# Patient Record
Sex: MALE | Race: WHITE
[De-identification: names, ages, dates, MRNs, and addresses within clinical notes are randomized per-mention and may not be internally consistent; named-entity substitution may affect disease eponyms.]

---

## 2020-01-22 VITALS — SYSTOLIC BLOOD PRESSURE: 156 MMHG | DIASTOLIC BLOOD PRESSURE: 83 MMHG

## 2020-01-22 LAB
BASOPHILS NFR BLD AUTO: 0.7 % (ref 0–5)
BUN SERPL-MCNC: 20 MG/DL (ref 7–18)
CHLORIDE SERPL-SCNC: 104 MMOL/L (ref 101–111)
CO2 SERPL-SCNC: 31 MMOL/L (ref 21–32)
CREAT SERPL-MCNC: 1 MG/DL (ref 0.5–1.5)
EOSINOPHIL NFR BLD AUTO: 1.3 % (ref 0–8)
ERYTHROCYTE [DISTWIDTH] IN BLOOD BY AUTOMATED COUNT: 14.4 % (ref 11–15.5)
GFR SERPL CREATININE-BSD FRML MDRD: 78 ML/MIN (ref 60–?)
GLUCOSE SERPL-MCNC: 119 MG/DL (ref 70–105)
HCT VFR BLD AUTO: 40.8 % (ref 42–54)
LYMPHOCYTES NFR SPEC AUTO: 30.6 % (ref 21–51)
MCH RBC QN AUTO: 30.6 PG (ref 27–33)
MCHC RBC AUTO-ENTMCNC: 33.3 G/DL (ref 32–36)
MCV RBC AUTO: 91.7 FL (ref 79–99)
MONOCYTES NFR BLD AUTO: 5.6 % (ref 3–13)
NEUTROPHILS NFR BLD AUTO: 61.5 % (ref 40–77)
NRBC BLD MANUAL-RTO: 0 % (ref 0–0.19)
PLATELET # BLD AUTO: 314 K/UL (ref 130–400)
POTASSIUM SERPL-SCNC: 4.2 MMOL/L (ref 3.5–5.1)
RBC # BLD AUTO: 4.45 MIL/UL (ref 4.5–6.2)
SODIUM SERPL-SCNC: 142 MMOL/L (ref 136–145)
WBC # BLD AUTO: 6.8 K/UL (ref 4.8–10.8)

## 2020-01-24 ENCOUNTER — HOSPITAL ENCOUNTER (OUTPATIENT)
Dept: HOSPITAL 90 - DAH | Age: 75
Discharge: HOME | End: 2020-01-24
Attending: SURGERY
Payer: MEDICARE

## 2020-01-24 VITALS — SYSTOLIC BLOOD PRESSURE: 133 MMHG | DIASTOLIC BLOOD PRESSURE: 75 MMHG

## 2020-01-24 VITALS — DIASTOLIC BLOOD PRESSURE: 74 MMHG | SYSTOLIC BLOOD PRESSURE: 134 MMHG

## 2020-01-24 VITALS — SYSTOLIC BLOOD PRESSURE: 148 MMHG | DIASTOLIC BLOOD PRESSURE: 78 MMHG

## 2020-01-24 VITALS — DIASTOLIC BLOOD PRESSURE: 80 MMHG | SYSTOLIC BLOOD PRESSURE: 144 MMHG

## 2020-01-24 VITALS — BODY MASS INDEX: 23.2 KG/M2 | WEIGHT: 147.8 LBS | HEIGHT: 67 IN

## 2020-01-24 VITALS — DIASTOLIC BLOOD PRESSURE: 64 MMHG | SYSTOLIC BLOOD PRESSURE: 136 MMHG

## 2020-01-24 VITALS — DIASTOLIC BLOOD PRESSURE: 79 MMHG | SYSTOLIC BLOOD PRESSURE: 145 MMHG

## 2020-01-24 VITALS — SYSTOLIC BLOOD PRESSURE: 143 MMHG | DIASTOLIC BLOOD PRESSURE: 78 MMHG

## 2020-01-24 VITALS — SYSTOLIC BLOOD PRESSURE: 134 MMHG | DIASTOLIC BLOOD PRESSURE: 71 MMHG

## 2020-01-24 VITALS — SYSTOLIC BLOOD PRESSURE: 132 MMHG | DIASTOLIC BLOOD PRESSURE: 69 MMHG

## 2020-01-24 VITALS — DIASTOLIC BLOOD PRESSURE: 71 MMHG | SYSTOLIC BLOOD PRESSURE: 136 MMHG

## 2020-01-24 VITALS — DIASTOLIC BLOOD PRESSURE: 82 MMHG | SYSTOLIC BLOOD PRESSURE: 154 MMHG

## 2020-01-24 VITALS — SYSTOLIC BLOOD PRESSURE: 190 MMHG | DIASTOLIC BLOOD PRESSURE: 86 MMHG

## 2020-01-24 VITALS — DIASTOLIC BLOOD PRESSURE: 73 MMHG | SYSTOLIC BLOOD PRESSURE: 138 MMHG

## 2020-01-24 VITALS — DIASTOLIC BLOOD PRESSURE: 65 MMHG | SYSTOLIC BLOOD PRESSURE: 111 MMHG

## 2020-01-24 VITALS — DIASTOLIC BLOOD PRESSURE: 74 MMHG | SYSTOLIC BLOOD PRESSURE: 132 MMHG

## 2020-01-24 DIAGNOSIS — Z90.49: ICD-10-CM

## 2020-01-24 DIAGNOSIS — Z82.49: ICD-10-CM

## 2020-01-24 DIAGNOSIS — Z88.8: ICD-10-CM

## 2020-01-24 DIAGNOSIS — I10: ICD-10-CM

## 2020-01-24 DIAGNOSIS — Z88.5: ICD-10-CM

## 2020-01-24 DIAGNOSIS — M06.9: ICD-10-CM

## 2020-01-24 DIAGNOSIS — E11.9: ICD-10-CM

## 2020-01-24 DIAGNOSIS — K40.90: Primary | ICD-10-CM

## 2020-01-24 DIAGNOSIS — Z79.899: ICD-10-CM

## 2020-01-24 DIAGNOSIS — Z79.82: ICD-10-CM

## 2020-01-24 DIAGNOSIS — Z72.89: ICD-10-CM

## 2020-01-24 DIAGNOSIS — Z98.890: ICD-10-CM

## 2020-01-24 PROCEDURE — 49505 PRP I/HERN INIT REDUC >5 YR: CPT

## 2020-01-24 PROCEDURE — 82948 REAGENT STRIP/BLOOD GLUCOSE: CPT

## 2020-01-24 PROCEDURE — 85025 COMPLETE CBC W/AUTO DIFF WBC: CPT

## 2020-01-24 PROCEDURE — 80048 BASIC METABOLIC PNL TOTAL CA: CPT

## 2020-01-24 PROCEDURE — 36415 COLL VENOUS BLD VENIPUNCTURE: CPT

## 2020-01-24 PROCEDURE — 93005 ELECTROCARDIOGRAM TRACING: CPT

## 2020-01-24 NOTE — NUR
ASSESSMENT

RECEIVED PT FROM PACU STAFF RADHA RN. PT DOING WELL. AAOX3. DRSG TO RIGHT INGUINAL HERNIA DRY 
AND INTACT. SOFT TO TOUCH. SISTER AT BEDSIDE.

## 2020-01-24 NOTE — NUR
SURGICAL PREP

pt clipper prep right ing area by GONZALO Moon 

-------------------------------------------------------------------------------

Addendum: 01/24/20 at 0746 by MAGO HENDRICKS RN RN

-------------------------------------------------------------------------------

Amended: Links added.

## 2020-01-24 NOTE — NUR
DISCHARGE

ORAL AND WRITTEN DISCHARGE INSTRUCTIONS GIVEN TO PT AND PTS SISTER ALONG WITH PRESCRIPTION. 
NO OTHER QUESTIONS AT THIS TIME. INSTRUCTED ON IMPORTANCE OF KEEPING DRSG TO GROIN DRY AND 
INTACT.

## 2023-02-14 ENCOUNTER — HOSPITAL ENCOUNTER (EMERGENCY)
Dept: HOSPITAL 90 - EDH | Age: 78
Discharge: HOME | End: 2023-02-14
Payer: MEDICARE

## 2023-02-14 VITALS — WEIGHT: 160.94 LBS | HEIGHT: 67 IN | BODY MASS INDEX: 25.26 KG/M2

## 2023-02-14 VITALS — SYSTOLIC BLOOD PRESSURE: 137 MMHG | DIASTOLIC BLOOD PRESSURE: 85 MMHG

## 2023-02-14 DIAGNOSIS — Z88.5: ICD-10-CM

## 2023-02-14 DIAGNOSIS — Z79.84: ICD-10-CM

## 2023-02-14 DIAGNOSIS — E11.9: ICD-10-CM

## 2023-02-14 DIAGNOSIS — Z79.1: ICD-10-CM

## 2023-02-14 DIAGNOSIS — L98.8: Primary | ICD-10-CM

## 2023-02-14 PROCEDURE — 99281 EMR DPT VST MAYX REQ PHY/QHP: CPT
